# Patient Record
Sex: MALE | Race: WHITE | NOT HISPANIC OR LATINO | URBAN - NONMETROPOLITAN AREA
[De-identification: names, ages, dates, MRNs, and addresses within clinical notes are randomized per-mention and may not be internally consistent; named-entity substitution may affect disease eponyms.]

---

## 2023-10-24 ENCOUNTER — APPOINTMENT (OUTPATIENT)
Dept: PRIMARY CARE | Facility: CLINIC | Age: 63
End: 2023-10-24

## 2024-02-13 ENCOUNTER — TELEPHONE (OUTPATIENT)
Dept: PRIMARY CARE | Facility: CLINIC | Age: 64
End: 2024-02-13

## 2024-02-13 NOTE — TELEPHONE ENCOUNTER
PT was informed and expressed understanding.  PT inquired if he needs to discontinue flying. Per [employer], pt does need to discontinue flying until treatment is complete.

## 2024-02-13 NOTE — TELEPHONE ENCOUNTER
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  Pt inquiring about how/if this dx will affect his eligibility to fly and if so, what next steps are to be cleared.

## 2024-02-13 NOTE — TELEPHONE ENCOUNTER
Depends on diagnosis, treatment, current status - need all treatment records, imaging reports, lab reports, and most recent treatment report indicating condition status and if ongoing treatment needed  He can email all these to me  - ok to give uh email  This is condition FAA will require their review of these reports to clear for flying - once I have reports I will upload to FAA for their review

## 2024-07-19 ENCOUNTER — TELEPHONE (OUTPATIENT)
Dept: PRIMARY CARE | Facility: CLINIC | Age: 64
End: 2024-07-19

## 2024-07-19 NOTE — TELEPHONE ENCOUNTER
Patient is having a brain tumor removed    He would like a list of all documentation you need from surgeon in order for flight status to change     [email or web address]